# Patient Record
Sex: MALE | Race: WHITE | NOT HISPANIC OR LATINO | URBAN - METROPOLITAN AREA
[De-identification: names, ages, dates, MRNs, and addresses within clinical notes are randomized per-mention and may not be internally consistent; named-entity substitution may affect disease eponyms.]

---

## 2019-09-18 ENCOUNTER — EMERGENCY (EMERGENCY)
Facility: HOSPITAL | Age: 29
LOS: 0 days | Discharge: HOME | End: 2019-09-19
Attending: EMERGENCY MEDICINE | Admitting: EMERGENCY MEDICINE
Payer: SUBSIDIZED

## 2019-09-18 VITALS
OXYGEN SATURATION: 100 % | HEIGHT: 76 IN | DIASTOLIC BLOOD PRESSURE: 79 MMHG | WEIGHT: 315 LBS | HEART RATE: 96 BPM | RESPIRATION RATE: 19 BRPM | SYSTOLIC BLOOD PRESSURE: 130 MMHG

## 2019-09-18 DIAGNOSIS — T40.7X1A POISONING BY CANNABIS (DERIVATIVES), ACCIDENTAL (UNINTENTIONAL), INITIAL ENCOUNTER: ICD-10-CM

## 2019-09-18 PROCEDURE — 99284 EMERGENCY DEPT VISIT MOD MDM: CPT

## 2019-09-18 NOTE — ED ADULT NURSE NOTE - PMH
No pertinent past medical history <<----- Click to add NO pertinent Past Medical History <<-----Click on this checkbox to enter Past Medical History
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
## 2019-09-19 VITALS
DIASTOLIC BLOOD PRESSURE: 74 MMHG | TEMPERATURE: 98 F | OXYGEN SATURATION: 98 % | SYSTOLIC BLOOD PRESSURE: 122 MMHG | RESPIRATION RATE: 19 BRPM | HEART RATE: 92 BPM

## 2019-09-19 LAB
ALBUMIN SERPL ELPH-MCNC: 4.6 G/DL — SIGNIFICANT CHANGE UP (ref 3.5–5.2)
ALP SERPL-CCNC: 82 U/L — SIGNIFICANT CHANGE UP (ref 30–115)
ALT FLD-CCNC: 26 U/L — SIGNIFICANT CHANGE UP (ref 0–41)
ANION GAP SERPL CALC-SCNC: 9 MMOL/L — SIGNIFICANT CHANGE UP (ref 7–14)
APAP SERPL-MCNC: <5 UG/ML — LOW (ref 10–30)
AST SERPL-CCNC: 37 U/L — SIGNIFICANT CHANGE UP (ref 0–41)
BASOPHILS # BLD AUTO: 0.04 K/UL — SIGNIFICANT CHANGE UP (ref 0–0.2)
BASOPHILS NFR BLD AUTO: 0.3 % — SIGNIFICANT CHANGE UP (ref 0–1)
BILIRUB SERPL-MCNC: 0.2 MG/DL — SIGNIFICANT CHANGE UP (ref 0.2–1.2)
BUN SERPL-MCNC: 14 MG/DL — SIGNIFICANT CHANGE UP (ref 10–20)
CALCIUM SERPL-MCNC: 9.5 MG/DL — SIGNIFICANT CHANGE UP (ref 8.5–10.1)
CHLORIDE SERPL-SCNC: 101 MMOL/L — SIGNIFICANT CHANGE UP (ref 98–110)
CO2 SERPL-SCNC: 30 MMOL/L — SIGNIFICANT CHANGE UP (ref 17–32)
CREAT SERPL-MCNC: 0.9 MG/DL — SIGNIFICANT CHANGE UP (ref 0.7–1.5)
EOSINOPHIL # BLD AUTO: 0.03 K/UL — SIGNIFICANT CHANGE UP (ref 0–0.7)
EOSINOPHIL NFR BLD AUTO: 0.3 % — SIGNIFICANT CHANGE UP (ref 0–8)
ETHANOL SERPL-MCNC: <10 MG/DL — SIGNIFICANT CHANGE UP
GLUCOSE SERPL-MCNC: 112 MG/DL — HIGH (ref 70–99)
HCT VFR BLD CALC: 44 % — SIGNIFICANT CHANGE UP (ref 42–52)
HGB BLD-MCNC: 14.3 G/DL — SIGNIFICANT CHANGE UP (ref 14–18)
IMM GRANULOCYTES NFR BLD AUTO: 0.3 % — SIGNIFICANT CHANGE UP (ref 0.1–0.3)
LYMPHOCYTES # BLD AUTO: 1.56 K/UL — SIGNIFICANT CHANGE UP (ref 1.2–3.4)
LYMPHOCYTES # BLD AUTO: 13.1 % — LOW (ref 20.5–51.1)
MCHC RBC-ENTMCNC: 27.7 PG — SIGNIFICANT CHANGE UP (ref 27–31)
MCHC RBC-ENTMCNC: 32.5 G/DL — SIGNIFICANT CHANGE UP (ref 32–37)
MCV RBC AUTO: 85.1 FL — SIGNIFICANT CHANGE UP (ref 80–94)
MONOCYTES # BLD AUTO: 0.69 K/UL — HIGH (ref 0.1–0.6)
MONOCYTES NFR BLD AUTO: 5.8 % — SIGNIFICANT CHANGE UP (ref 1.7–9.3)
NEUTROPHILS # BLD AUTO: 9.52 K/UL — HIGH (ref 1.4–6.5)
NEUTROPHILS NFR BLD AUTO: 80.2 % — HIGH (ref 42.2–75.2)
NRBC # BLD: 0 /100 WBCS — SIGNIFICANT CHANGE UP (ref 0–0)
PLATELET # BLD AUTO: 270 K/UL — SIGNIFICANT CHANGE UP (ref 130–400)
POTASSIUM SERPL-MCNC: 5 MMOL/L — SIGNIFICANT CHANGE UP (ref 3.5–5)
POTASSIUM SERPL-SCNC: 5 MMOL/L — SIGNIFICANT CHANGE UP (ref 3.5–5)
PROT SERPL-MCNC: 7.4 G/DL — SIGNIFICANT CHANGE UP (ref 6–8)
RBC # BLD: 5.17 M/UL — SIGNIFICANT CHANGE UP (ref 4.7–6.1)
RBC # FLD: 13.9 % — SIGNIFICANT CHANGE UP (ref 11.5–14.5)
SALICYLATES SERPL-MCNC: <0.3 MG/DL — LOW (ref 4–30)
SODIUM SERPL-SCNC: 140 MMOL/L — SIGNIFICANT CHANGE UP (ref 135–146)
WBC # BLD: 11.87 K/UL — HIGH (ref 4.8–10.8)
WBC # FLD AUTO: 11.87 K/UL — HIGH (ref 4.8–10.8)
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
## 2019-09-19 NOTE — ED PROVIDER NOTE - NS ED ROS FT

## 2019-09-19 NOTE — ED PROVIDER NOTE - PATIENT PORTAL LINK FT
You can access the FollowMyHealth Patient Portal offered by [facility] by registering at the following website: [email or web address]. By joining [facility]’s FollowMyHealth portal, you will also be able to view your health information using other applications (apps) compatible with our system.

## 2019-09-19 NOTE — ED PROVIDER NOTE - NSFOLLOWUPINSTRUCTIONS_ED_ALL_ED_FT
[facility]    Opioid Overdose  Opioids are substances that relieve pain by binding to pain receptors in your brain and spinal cord. Opioids include illegal drugs, such as heroin, as well as prescription pain medicines. An opioid overdose happens when you take too much of an opioid substance. This can happen with any type of opioid, including:    Heroin.  Morphine.  Codeine.  Methadone.  Oxycodone.  Hydrocodone.  Fentanyl.  Hydromorphone.  Buprenorphine.    The effects of an overdose can be mild, dangerous, or even deadly. Opioid overdose is a medical emergency.    What are the causes?  This condition may be caused by:    Taking too much of an opioid by accident.  Taking too much of an opioid on purpose.  An error made by a health care provider who prescribes a medicine.  An error made by the pharmacist who fills the prescription order.  Using more than one substance that contains opioids at the same time.  Mixing an opioid with a substance that affects your heart, breathing, or blood pressure. These include alcohol, tranquilizers, sleeping pills, illegal drugs, and some over-the-counter medicines.    What increases the risk?  This condition is more likely in:    Children. They may be attracted to colorful pills. Because of a child's small size, even a small amount of a drug can be dangerous.  Elderly people. They may be taking many different drugs. Elderly people may have difficulty reading labels or remembering when they last took their medicine.  People who take an opioid on a long-term basis.  People who use:    Illegal drugs.  Other substances, including alcohol, while using an opioid.    People who have:    A history of drug or alcohol abuse.  Certain mental health conditions.    People who take opioids that are not prescribed for them.    What are the signs or symptoms?  Symptoms of this condition depend on the type of opioid and the amount that was taken. Common symptoms include:    Sleepiness or difficulty waking from sleep.  Confusion.  Slurred speech.  Slowed breathing and a slow pulse.  Nausea and vomiting.  Abnormally small pupils.    Signs and symptoms that require emergency treatment include:    Cold, clammy, and pale skin.  Blue lips and fingernails.  Vomiting.  Gurgling sounds in the throat.  A pulse that is very slow or difficult to detect.  Breathing that is very slow, noisy, or difficult to detect.  Limp body.  Inability to respond to speech or be awakened from sleep (stupor).    How is this diagnosed?  This condition is diagnosed based on your symptoms. It is important to tell your health care provider:    All of the opioids that you took.  When you took the opioids.  Whether you were drinking alcohol or using other substances.    Your health care provider will do a physical exam. This exam may include:    Checking and monitoring your heart rate and rhythm, your breathing rate and depth, your temperature, and your blood pressure (vital signs).  Checking for abnormally small pupils.  Measuring oxygen levels in your blood.    You may also have blood tests or urine tests.    How is this treated?  Supporting your vital signs and your breathing is the first step in treating an opioid overdose. Treatment may also include:    Giving fluids and minerals (electrolytes) through an IV tube.  Inserting a breathing tube (endotracheal tube) in your airway to help you breathe.  Giving oxygen.  Passing a tube through your nose and into your stomach (NG tube, or nasogastric tube) to wash out your stomach.  Giving medicines that:    Increase your blood pressure.  Absorb any opioid that is in your digestive system.  Reverse the effects of the opioid (naloxone).    Ongoing counseling and mental health support if you intentionally overdosed or used an illegal drug.    Follow these instructions at home:  Take over-the-counter and prescription medicines only as told by your health care provider. Always ask your health care provider about possible side effects and interactions of any new medicine that you start taking.  Keep a list of all of the medicines that you take, including over-the-counter medicines. Bring this list with you to all of your medical visits.  Drink enough fluid to keep your urine clear or pale yellow.  Keep all follow-up visits as told by your health care provider. This is important.  How is this prevented?  ImageGet help if you are struggling with:    Alcohol or drug use.  Depression or another mental health problem.    Keep the phone number of your local poison control center near your phone or on your cell phone.  Store all medicines in safety containers that are out of the reach of children.  Read the drug inserts that come with your medicines.  Do not drink alcohol when taking opioids.  Do not use illegal drugs.  Do not take opioid medicines that are not prescribed for you.  Contact a health care provider if:  Your symptoms return.  You develop new symptoms or side effects when you are taking medicines.  Get help right away if:  You think that you or someone else may have taken too much of an opioid. The hotline of the National Poison Control Center is (299) 454-5693.  You or someone else is having symptoms of an opioid overdose.  You have serious thoughts about hurting yourself or others.  You have:    Chest pain.  Difficulty breathing.  A loss of consciousness.    Opioid overdose is an emergency. Do not wait to see if the symptoms will go away. Get medical help right away. Call your local emergency services (911 in the [place] Do not drive yourself to the hospital.     This information is not intended to replace advice given to you by your health care provider. Make sure you discuss any questions you have with your health care provider

## 2019-09-19 NOTE — ED PROVIDER NOTE - PROGRESS NOTE DETAILS
patient finally admitted he used heroin tonight [facility] came and evaluated patient. instructed family and patient how to use Narcan kit.

## 2019-09-19 NOTE — ED PROVIDER NOTE - CLINICAL SUMMARY MEDICAL DECISION MAKING FREE TEXT BOX
Pt pw overdose -  admitted  to  taking IV heroin which he denied at first until his brother found a needle in his bedroom  - pt denies si hi  -  no narcan  given .  pt observed in ED   - no need for narcan  pt awake ambulating  normal respiration.s. labs wnl -  relay peer  counselled patient in ED .  Patient to be discharged from ED. Any available test results were discussed with and printed  for patient.  Verbal instructions given, including instructions to return to ED immediately for any new, worsening, or concerning symptoms. Patient reports understanding of above with capacity and insight. Written discharge instructions additionally given, including follow-up plan with.

## 2019-09-19 NOTE — ED PROVIDER NOTE - PHYSICAL EXAMINATION
CONSTITUTIONAL: Well-appearing; well-nourished; in no apparent distress.   EYES: PERRL; EOM intact. pupils pinpoint b/l.   CARDIOVASCULAR: Normal S1, S2; no murmurs, rubs, or gallops.   RESPIRATORY: Normal chest excursion with respiration; breath sounds clear and equal bilaterally; no wheezes, rhonchi, or rales.  GI/: Normal bowel sounds; non-distended; non-tender; no palpable organomegaly.   MS: No evidence of trauma or deformity to extremities.   SKIN: Normal for age and race; warm; dry; good turgor; no apparent lesions or exudate.   NEURO/PSYCH: A & O x 4; grossly unremarkable.
